# Patient Record
Sex: FEMALE | Race: BLACK OR AFRICAN AMERICAN | URBAN - METROPOLITAN AREA
[De-identification: names, ages, dates, MRNs, and addresses within clinical notes are randomized per-mention and may not be internally consistent; named-entity substitution may affect disease eponyms.]

---

## 2017-01-02 ENCOUNTER — OFFICE VISIT (OUTPATIENT)
Dept: FAMILY MEDICINE | Facility: CLINIC | Age: 47
End: 2017-01-02
Payer: COMMERCIAL

## 2017-01-02 VITALS
BODY MASS INDEX: 26.13 KG/M2 | DIASTOLIC BLOOD PRESSURE: 63 MMHG | SYSTOLIC BLOOD PRESSURE: 101 MMHG | HEART RATE: 83 BPM | OXYGEN SATURATION: 100 % | WEIGHT: 154 LBS | TEMPERATURE: 99.3 F | RESPIRATION RATE: 20 BRPM

## 2017-01-02 DIAGNOSIS — Z23 NEED FOR PROPHYLACTIC VACCINATION AND INOCULATION AGAINST INFLUENZA: ICD-10-CM

## 2017-01-02 DIAGNOSIS — M25.511 RIGHT SHOULDER PAIN, UNSPECIFIED CHRONICITY: Primary | ICD-10-CM

## 2017-01-02 PROCEDURE — 90686 IIV4 VACC NO PRSV 0.5 ML IM: CPT | Performed by: FAMILY MEDICINE

## 2017-01-02 PROCEDURE — 90471 IMMUNIZATION ADMIN: CPT | Performed by: FAMILY MEDICINE

## 2017-01-02 PROCEDURE — 99213 OFFICE O/P EST LOW 20 MIN: CPT | Mod: 25 | Performed by: FAMILY MEDICINE

## 2017-01-02 ASSESSMENT — PAIN SCALES - GENERAL: PAINLEVEL: MODERATE PAIN (4)

## 2017-01-02 NOTE — MR AVS SNAPSHOT
After Visit Summary   1/2/2017    Maris Saenz    MRN: [id number]           Patient Information     Date Of Birth          1970        Visit Information        Provider Department      1/2/2017 7:20 AM Shreyas Alarcon MD [facility]        Today's Diagnoses     Right shoulder pain, unspecified chronicity    -  1     Need for prophylactic vaccination and inoculation against influenza           Care Instructions    Use over the counter ibuprofen ( advil/ motrin/ etc ) or naproxen ( alleve ) as needed, take with food    Physical therapy    Can still use occasionally tylenol/acetaminophen    Follow up if symptoms not improving        Follow-ups after your visit        Additional Services     [facility], AND CHIROPRACTIC REFERRAL       **This order will print in the [facility] Scheduling Office**    Physical Therapy, Hand Therapy and Chiropractic Care are available through:    *[facility]  *[facility]  *[facility]    Call one number to schedule at any of the above locations: [phone].    Your provider has referred you to: Physical Therapy at [facility] or [facility]    Indication/Reason for Referral: right shoulder   Onset of Illness: 2 months  Therapy Orders: Evaluate and Treat  Special Programs: None  Special Request: None    Rolan Gongora      Additional Comments for the Therapist or Chiropractor: needs good home exercise program for shoulder    Please be aware that coverage of these services is subject to the terms and limitations of your health insurance plan.  Call member services at your health plan with any benefit or coverage questions.      Please bring the following to your appointment:    *Your personal calendar for scheduling future appointments  *Comfortable clothing                  Who to contact     If you have questions or need follow up information about today's clinic visit or your schedule please contact  "[facility] directly at [phone].  Normal or non-critical lab and imaging results will be communicated to you by MyChart, letter or phone within 4 business days after the clinic has received the results. If you do not hear from us within 7 days, please contact the clinic through MyChart or phone. If you have a critical or abnormal lab result, we will notify you by phone as soon as possible.  Submit refill requests through [employer] or call your pharmacy and they will forward the refill request to us. Please allow 3 business days for your refill to be completed.          Additional Information About Your Visit        Office [employer] Information     [employer] lets you send messages to your doctor, view your test results, renew your prescriptions, schedule appointments and more. To sign up, go to [email or web address] . Click on \"Log in\" on the left side of the screen, which will take you to the Welcome page. Then click on \"Sign up Now\" on the right side of the page.     You will be asked to enter the access code listed below, as well as some personal information. Please follow the directions to create your username and password.     Your access code is: [email or web address]  Expires: 2017  8:04 AM     Your access code will  in 90 days. If you need help or a new code, please call your [facility] or [phone].        Your Vitals Were     Pulse Temperature Respirations Pulse Oximetry Breastfeeding?       83 99.3  F (37.4  C) (Oral) 20 100% No        Blood Pressure from Last 3 Encounters:   17 101/63   16 106/64   08/28/15 120/80    Weight from Last 3 Encounters:   17 154 lb (69.854 kg)   16 154 lb (69.854 kg)   08/28/15 149 lb (67.586 kg)              We Performed the Following     FLU VAC, SPLIT VIRUS IM > 3 YO (QUADRIVALENT) [54349]     [facility], AND CHIROPRACTIC REFERRAL     Vaccine Administration, Initial [86284]        Primary Care Provider Office Phone # Fax # "    Ale Farrell PA-C [phone]       [facility] [address]  [place]. \Hospital Has a New Name and Outlook.\"" [phone]        Thank you!     Thank you for choosing [facility]  for your care. Our goal is always to provide you with excellent care. Hearing back from our patients is one way we can continue to improve our services. Please take a few minutes to complete the written survey that you may receive in the mail after your visit with us. Thank you!             Your Updated Medication List - Protect others around you: Learn how to safely use, store and throw away your medicines at www.disposemymeds.org.      Notice  As of 1/2/2017  8:04 AM    You have not been prescribed any medications.

## 2017-01-02 NOTE — PATIENT INSTRUCTIONS
Use over the counter ibuprofen ( advil/ motrin/ etc ) or naproxen ( alleve ) as needed, take with food    Physical therapy    Can still use occasionally tylenol/acetaminophen    Follow up if symptoms not improving

## 2017-01-02 NOTE — PROGRESS NOTES
SUBJECTIVE:                                                    Maris Saenz is a 46 year old female who presents to clinic today for the following health issues:      Joint Pain     Onset: Couple months     Description:   Location: right shoulder  Character: Dull ache and Burning    Intensity: moderate    Progression of Symptoms: intermittent    Accompanying Signs & Symptoms:  Other symptoms: radiation of pain to center of hand   History:   Previous similar pain: YES- had something like this before but it went away       Precipitating factors:   Trauma or overuse: no     Alleviating factors:  Improved by: rest/inactivity and acetaminophen       Therapies Tried and outcome: Rest and Tylenol            Problem list and histories reviewed & adjusted, as indicated.  Additional history: as documented              Injectable Influenza Immunization Documentation    1.  Is the person to be vaccinated sick today?  No    2. Does the person to be vaccinated have an allergy to eggs or to a component of the vaccine?  No    3. Has the person to be vaccinated today ever had a serious reaction to influenza vaccine in the past?  No    4. Has the person to be vaccinated ever had Guillain-[place] syndrome?  No     Form completed by Libertad Gee CMA     HPI      ROS  Was better but now worse again for the last 3 weeks    Right hand seems swollen    Pain goes custodial down arm       No change recently    No new  Hobbies/ sports etc    Not working for a few days so pain not as bad    Hard to do things over head    Not much exercise recently    Does not have to lift things up high at work        Physical Exam    Full physical not done     Mentation and affect are fine    No tremor of speech or extremity    Neck range of motion is fine, no radiculopathy ; just some mild subj soreness right side of neck    Some limitations on right shoulder range of motion both passive and active    Pain when arm elevated above  head    Sensation and strength good distally in arms/ hands    Good radial pulses    No point tenderness over shoulder / scapula etc ; patient states when she has been using it a lot then it is tender on front      No obvious impingement signs    ASSESSMENT / PLAN:  (M25.511) Right shoulder pain, unspecified chronicity  (primary encounter diagnosis)  Comment: overall exam is not alarming.  Prudent to work with physical therapist.  Also try over the counter nsaids.   Plan: [facility] HAND, AND CHIROPRACTIC REFERRAL        Follow up if symptoms not improving     (Z23) Need for prophylactic vaccination and inoculation against influenza  Comment: needs   Plan: FLU VAC, SPLIT VIRUS IM > 3 YO (QUADRIVALENT)         [38634], Vaccine Administration, Initial         [05559]        Given       I reviewed the patient's medications, allergies, medical history, family history, and social history.    Shreyas Alarcon MD

## 2017-01-02 NOTE — NURSING NOTE
"Chief Complaint   Patient presents with     Shoulder Pain     Health Maintenance     Other     bpa       Initial /63 mmHg  Pulse 83  Temp(Src) 99.3  F (37.4  C) (Oral)  Resp 20  Wt 154 lb (69.854 kg)  SpO2 100%  Breastfeeding? No Estimated body mass index is 26.13 kg/(m^2) as calculated from the following:    Height as of 2/19/16: 5' 4.37\" (1.635 m).    Weight as of this encounter: 154 lb (69.854 kg).  BP completed using cuff size: regular  Libertad Gee CMA       "

## 2017-01-03 ENCOUNTER — THERAPY VISIT (OUTPATIENT)
Dept: PHYSICAL THERAPY | Facility: CLINIC | Age: 47
End: 2017-01-03
Payer: COMMERCIAL

## 2017-01-03 DIAGNOSIS — M25.811 SHOULDER IMPINGEMENT, RIGHT: Primary | ICD-10-CM

## 2017-01-03 PROCEDURE — 97161 PT EVAL LOW COMPLEX 20 MIN: CPT | Mod: GP | Performed by: PHYSICAL THERAPIST

## 2017-01-03 PROCEDURE — 97110 THERAPEUTIC EXERCISES: CPT | Mod: GP | Performed by: PHYSICAL THERAPIST

## 2017-01-03 PROCEDURE — 97112 NEUROMUSCULAR REEDUCATION: CPT | Mod: GP | Performed by: PHYSICAL THERAPIST

## 2017-01-03 NOTE — PROGRESS NOTES
[facility] Initial Evaluation    Subjective:    Maris Saenz is a 46 year old female with a right shoulder condition.  Condition occurred with:  Unknown cause.  Condition occurred: for unknown reasons.  This is a chronic and recurrent condition  MD referral 1/2/2017.   Pain x 2 months overall.  Initial pain about 5 months ago and then it went away..    Patient reports pain:  Posterior and anterior.  Radiates to:  Upper arm.  Pain is described as aching (tight) and is intermittent and reported as 8/10.  Associated symptoms:  Painful arc and loss of motion/stiffness. Pain is the same all the time.  Symptoms are exacerbated by using arm overhead, using arm at shoulder level, lifting and using arm behind back and relieved by rest.  Since onset symptoms are unchanged.        General health as reported by patient is good.  Pertinent medical history includes:  Cancer.  Medical allergies: yes (codeine).  Other surgeries include:  Cancer surgery.  Current medications:  None as reported by the patient.  Current occupation is .    Employment tasks: push/ pull.                              Objective:    Standing Alignment:      Shoulder/UE:  Rounded shoulders                                       Shoulder Evaluation:  ROM:  AROM:    Flexion:  Right:  107    Abduction:  Right:  90    Internal Rotation:  Right:  Belt line                  PROM:    Flexion:  Right: 110      Abduction:  Right:  100                          Strength:  : resistance increases pain.  Flexion: Right: 4+/5     Pain:   Extension:  Right: 5-/5    Pain:  Abduction:  Right: 4+/5     Pain:  Adduction:  Right: 5/5     Pain:  Internal Rotation:  Right: 5-/5     Pain:  External Rotation:   Right:4+/5     Pain:                Special Tests:      Right shoulder positive for the following special tests:Impingement and Bursal  Right shoulder negative for the following special tests:Neural Tension and Rotator cuff tear  Palpation:       Right shoulder tenderness present at: Acrimioclavicular; Supraspinatus; Infraspinatus and Deltoid                                     General     ROS    Assessment/Plan:      Patient is a 46 year old female with right side shoulder complaints.    Patient has the following significant findings with corresponding treatment plan.                Diagnosis 1:  R shoulder impingement  Pain -  manual therapy, self management, education, directional preference exercise and home program  Decreased ROM/flexibility - manual therapy and therapeutic exercise  Decreased strength - therapeutic exercise and therapeutic activities  Impaired muscle performance - neuro re-education  Decreased function - therapeutic activities    Therapy Evaluation Codes:   1) History comprised of:   Personal factors that impact the plan of care:      None.    Comorbidity factors that impact the plan of care are:      Cancer.     Medications impacting care: None.  2) Examination of Body Systems comprised of:   Body structures and functions that impact the plan of care:      Shoulder.   Activity limitations that impact the plan of care are:      Dressing.   Clinical presentation characteristics are:    Stable/Uncomplicated.  3) Presentation comprised of:   Presentation scored as Low complexity with uncomplicated characteristics..  4) Decision-Making    Low complexity using standardized patient assessment instrument and/or measureable assessment of functional outcome.  Cumulative Therapy Evaluation is: Low complexity.    Previous and current functional limitations:  (See Goal Flow Sheet for this information)    Short term and Long term goals: (See Goal Flow Sheet for this information)     Communication ability:  Patient appears to be able to clearly communicate and understand verbal and written communication and follow directions correctly.  Treatment Explanation - The following has been discussed with the patient:   RX ordered/plan of  care  Anticipated outcomes  Possible risks and side effects  This patient would benefit from PT intervention to resume normal activities.   Rehab potential is good.    Frequency:  1 X week, once daily  Duration:  for 8 weeks  Discharge Plan:  Achieve all LTG.  Independent in home treatment program.  Reach maximal therapeutic benefit.    Please refer to the daily flowsheet for treatment today, total treatment time and time spent performing 1:1 timed codes.

## 2017-01-04 PROBLEM — M25.811 SHOULDER IMPINGEMENT, RIGHT: Status: ACTIVE | Noted: 2017-01-04

## 2017-04-24 ENCOUNTER — OFFICE VISIT (OUTPATIENT)
Dept: INTERNAL MEDICINE | Facility: CLINIC | Age: 47
End: 2017-04-24
Payer: COMMERCIAL

## 2017-04-24 VITALS
BODY MASS INDEX: 26.29 KG/M2 | HEART RATE: 93 BPM | OXYGEN SATURATION: 100 % | WEIGHT: 154 LBS | HEIGHT: 64 IN | SYSTOLIC BLOOD PRESSURE: 106 MMHG | DIASTOLIC BLOOD PRESSURE: 67 MMHG | TEMPERATURE: 97.8 F

## 2017-04-24 DIAGNOSIS — M25.811 SHOULDER IMPINGEMENT, RIGHT: ICD-10-CM

## 2017-04-24 DIAGNOSIS — T78.40XA ALLERGIC REACTION, INITIAL ENCOUNTER: ICD-10-CM

## 2017-04-24 DIAGNOSIS — Z11.3 SCREEN FOR STD (SEXUALLY TRANSMITTED DISEASE): ICD-10-CM

## 2017-04-24 DIAGNOSIS — Z00.00 ROUTINE GENERAL MEDICAL EXAMINATION AT A HEALTH CARE FACILITY: Primary | ICD-10-CM

## 2017-04-24 DIAGNOSIS — C53.9 MALIGNANT NEOPLASM OF CERVIX, UNSPECIFIED SITE (H): ICD-10-CM

## 2017-04-24 LAB
ANION GAP SERPL CALCULATED.3IONS-SCNC: 7 MMOL/L (ref 3–14)
BUN SERPL-MCNC: 8 MG/DL (ref 7–30)
C TRACH DNA SPEC QL NAA+PROBE: ABNORMAL
CALCIUM SERPL-MCNC: 8.9 MG/DL (ref 8.5–10.1)
CHLORIDE SERPL-SCNC: 104 MMOL/L (ref 94–109)
CO2 SERPL-SCNC: 27 MMOL/L (ref 20–32)
CREAT SERPL-MCNC: 0.78 MG/DL (ref 0.52–1.04)
GFR SERPL CREATININE-BSD FRML MDRD: 78 ML/MIN/1.7M2
GLUCOSE SERPL-MCNC: 84 MG/DL (ref 70–99)
N GONORRHOEA DNA SPEC QL NAA+PROBE: ABNORMAL
POTASSIUM SERPL-SCNC: 3.9 MMOL/L (ref 3.4–5.3)
SODIUM SERPL-SCNC: 138 MMOL/L (ref 133–144)
SPECIMEN SOURCE: ABNORMAL
SPECIMEN SOURCE: ABNORMAL
TSH SERPL DL<=0.005 MIU/L-ACNC: 1.26 MU/L (ref 0.4–4)

## 2017-04-24 PROCEDURE — G0145 SCR C/V CYTO,THINLAYER,RESCR: HCPCS | Performed by: NURSE PRACTITIONER

## 2017-04-24 PROCEDURE — 87491 CHLMYD TRACH DNA AMP PROBE: CPT | Performed by: NURSE PRACTITIONER

## 2017-04-24 PROCEDURE — 86780 TREPONEMA PALLIDUM: CPT | Performed by: NURSE PRACTITIONER

## 2017-04-24 PROCEDURE — 87522 HEPATITIS C REVRS TRNSCRPJ: CPT | Performed by: NURSE PRACTITIONER

## 2017-04-24 PROCEDURE — 84443 ASSAY THYROID STIM HORMONE: CPT | Performed by: NURSE PRACTITIONER

## 2017-04-24 PROCEDURE — 87591 N.GONORRHOEAE DNA AMP PROB: CPT | Performed by: NURSE PRACTITIONER

## 2017-04-24 PROCEDURE — 87389 HIV-1 AG W/HIV-1&-2 AB AG IA: CPT | Performed by: NURSE PRACTITIONER

## 2017-04-24 PROCEDURE — 36415 COLL VENOUS BLD VENIPUNCTURE: CPT | Performed by: NURSE PRACTITIONER

## 2017-04-24 PROCEDURE — 80048 BASIC METABOLIC PNL TOTAL CA: CPT | Performed by: NURSE PRACTITIONER

## 2017-04-24 PROCEDURE — 99396 PREV VISIT EST AGE 40-64: CPT | Performed by: NURSE PRACTITIONER

## 2017-04-24 PROCEDURE — 87624 HPV HI-RISK TYP POOLED RSLT: CPT | Performed by: NURSE PRACTITIONER

## 2017-04-24 PROCEDURE — 87340 HEPATITIS B SURFACE AG IA: CPT | Performed by: NURSE PRACTITIONER

## 2017-04-24 RX ORDER — FEXOFENADINE HCL 180 MG/1
180 TABLET ORAL DAILY
Qty: 30 TABLET | Refills: 3 | Status: SHIPPED | OUTPATIENT
Start: 2017-04-24

## 2017-04-24 ASSESSMENT — PAIN SCALES - GENERAL: PAINLEVEL: NO PAIN (0)

## 2017-04-24 NOTE — NURSING NOTE
"Chief Complaint   Patient presents with     Physical       Initial /67  Pulse 93  Temp 97.8  F (36.6  C) (Oral)  Ht 5' 4\" (1.626 m)  Wt 154 lb (69.9 kg)  LMP 04/15/2017  SpO2 100%  Breastfeeding? No  BMI 26.43 kg/m2 Estimated body mass index is 26.43 kg/(m^2) as calculated from the following:    Height as of this encounter: 5' 4\" (1.626 m).    Weight as of this encounter: 154 lb (69.9 kg).  Medication Reconciliation: complete.  SATHISH Simon MA      "

## 2017-04-24 NOTE — LETTER
[facility]  [address] [place]  [address]  [phone]        April 28, 2017    Maris Saenz  [address]   [facility] [id number]        Dear Maris,    The results of your recent labs are enclosed.   Your labs look good! STD screening is negative.     Results for orders placed or performed in visit on 04/24/17   Basic metabolic panel   Result Value Ref Range    Sodium 138 133 - 144 mmol/L    Potassium 3.9 3.4 - 5.3 mmol/L    Chloride 104 94 - 109 mmol/L    Carbon Dioxide 27 20 - 32 mmol/L    Anion Gap 7 3 - 14 mmol/L    Glucose 84 70 - 99 mg/dL    Urea Nitrogen 8 7 - 30 mg/dL    Creatinine 0.78 0.52 - 1.04 mg/dL    GFR Estimate 78 >60 mL/min/1.7m2    GFR Estimate If Black >90   GFR Calc   >60 mL/min/1.7m2    Calcium 8.9 8.5 - 10.1 mg/dL   Pap imaged thin layer screen with HPV - recommended age 30 - 65 years (select HPV order below)   Result Value Ref Range    PAP NIL     Copath Report         Patient Name: MARIS SAENZ  MR#: [id number]  Specimen #: [id number]  Collected: 4/24/2017  Received: 4/25/2017  Reported: 4/26/2017 10:41  Ordering Phy(s): CHADWICK SARMIENTO    For improved result formatting, select 'View Enhanced Report Format'  under Linked Documents section.    SPECIMEN/STAIN PROCESS:  Pap imaged thin layer prep screening (Surepath, FocalPoint with guided  screening)       Pap-Cyto x 1, HPV ordered x 1    SOURCE: Cervical, endocervical  ----------------------------------------------------------------   Pap imaged thin layer prep screening (Surepath, FocalPoint with guided  screening)  SPECIMEN ADEQUACY:  Satisfactory for evaluation.  -Transformation zone component absent.    CYTOLOGIC INTERPRETATION:    Negative for Intraepithelial Lesion or Malignancy    Electronically signed out by:  ELICEO Ocasio (ASCP)    Processed and screened at [facility],  [facility]    CLINICAL HISTORY:  LMP:  4/15/2017  Previous normal  pap  Date of Last Pap: 8/28/2015  Gyn Cancer:: Cervical Cancer 10 years ago,    Papanicolaou Test Limitations:  Cervical cytology is a screening test  with limited sensitivity; regular screening is critical for cancer  prevention; Pap tests are primarily effective for the  diagnosis/prevention of squamous cell carcinoma, not adenocarcinomas or  other cancers.    TESTING LAB LOCATION:  [address]  [phone]    COLLECTION SITE:  Client:  [facility]  Location: [facility] (B)     HPV High Risk Types DNA Cervical   Result Value Ref Range    HPV 16 DNA Negative NEG    HPV 18 DNA Negative NEG    Other HR HPV Negative NEG    Final Diagnosis       This patient's sample is negative for HPV DNA.  (Note)  METHODOLOGY:  The Roche jorge alberto 4800 system uses automated extraction,  simultaneous amplification of HPV (L1 region) and beta-globin,  followed by  real time detection of fluorescent labeled HPV and beta  globin using specific oligonucleotide probes . The test specifically  identifies types HPV 16 DNA and HPV 18 DNA while concurrently  detecting the rest of the high risk types (31, 33, 35, 39, 45, 51,  52, 56, 58, 59, 66 or 68).    COMMENTS:  This test is not intended for use as a screening device  for women under age 30 with normal cervical cytology.  Results should  be correlated with cytologic and histologic findings. Close clinical  followup is recommended.    This test was developed and its performance characteristics  determined by the [facility], [facility]. It has not been cleared or approved by the  FDA. The laboratory is regulated under CLIA as qualified to perform  high- complexity testing. This test is used for clinical purposes. It  should not be regarded as investigational or for research.        Specimen Description Cervical  Cells  [id number]      HIV Antigen Antibody Combo   Result Value Ref Range    HIV Antigen Antibody Combo  NR     Nonreactive   HIV-1 p24 Ag & HIV-1/HIV-2 Ab Not Detected     Anti Treponema   Result Value Ref Range    Treponema pallidum Antibody Negative NEG   Hepatitis C RNA, quantitative   Result Value Ref Range    HCV RNA Quant IU/ml  HCVND [IU]/mL     HCV RNA Not Detected   The MONICA AmpliPrep/MONICA TaqMan HCV Test is an FDA-approved in vitro nucleic   acid amplification test for the quantitation of HCV RNA in human plasma (ETDA   plasma) or serum using the MONICA AmpliPrep Instrument for automated viral   nucleic acid extraction and the MONICA TaqMan Analyzer or Compound Time TaqMan for   automated Real Time PCR amplification and detection of the viral nucleic acid   target.   Titer results are reported in International Units/mL (IU/mL) using the 1st WHO   International standard for HCV for Nucleic Acid Amplification based assays.      Log of HCV RNA Qt Not Calculated <1.2 Log IU/mL   Hepatitis B surface antigen   Result Value Ref Range    Hep B Surface Agn Nonreactive NR   TSH with free T4 reflex   Result Value Ref Range    TSH 1.26 0.40 - 4.00 mU/L   Neisseria gonorrhoeae PCR   Result Value Ref Range    Specimen Descrip Urine     N Gonorrhea PCR (A) NEG     Test canceled by physician  ORDER ERROR, WRONG SOURCE.  TESTING REORDERED.     Chlamydia trachomatis PCR   Result Value Ref Range    Specimen Description Urine     Chlamydia Trachomatis PCR (A) NEG     Test canceled by physician  ORDER ERROR, WRONG SOURCE.  TESTING REORDERED.     Neisseria gonorrhoeae PCR   Result Value Ref Range    Specimen Descrip Endocervical     N Gonorrhea PCR  NEG     Negative   Negative for N. gonorrhoeae rRNA by transcription mediated amplification.   A negative result by transcription mediated amplification does not preclude the   presence of N. gonorrhoeae infection because results are dependent on proper   and adequate collection, absence  of inhibitors, and sufficient rRNA to be   detected.     Chlamydia trachomatis PCR   Result Value Ref Range    Specimen Description Endocervical     Chlamydia Trachomatis PCR  NEG     Negative   Negative for C. trachomatis rRNA by transcription mediated amplification.   A negative result by transcription mediated amplification does not preclude the   presence of C. trachomatis infection because results are dependent on proper   and adequate collection, absence of inhibitors, and sufficient rRNA to be   detected.         If you have any questions please call the clinic at [phone].    Sincerely,    Noemi COFFMANL

## 2017-04-24 NOTE — MR AVS SNAPSHOT
After Visit Summary   4/24/2017    Maris Saenz    MRN: [id number]           Patient Information     Date Of Birth          1970        Visit Information        Provider Department      4/24/2017 9:20 AM Noemi Spring APRN [facility]        Today's Diagnoses     Encounter for preventative adult health care exam with abnormal findings    -  1    Allergic reaction, initial encounter        Malignant neoplasm of cervix, unspecified site (H)        Screen for STD (sexually transmitted disease)        Shoulder impingement, right          Care Instructions      Preventive Health Recommendations  Female Ages 40 to 49    Yearly exam:     See your health care provider every year in order to  1. Review health changes.   2. Discuss preventive care.    3. Review your medicines if your doctor prescribed any.      Get a Pap test every three years (unless you have an abnormal result and your provider advises testing more often).      If you get Pap tests with HPV test, you only need to test every 5 years, unless you have an abnormal result. You do not need a Pap test if your uterus was removed (hysterectomy) and you have not had cancer.      You should be tested each year for STDs (sexually transmitted diseases), if you're at risk.       Ask your doctor if you should have a mammogram.      Have a colonoscopy (test for colon cancer) if someone in your family has had colon cancer or polyps before age 50.       Have a cholesterol test every 5 years.       Have a diabetes test (fasting glucose) after age 45. If you are at risk for diabetes, you should have this test every 3 years.    Shots: Get a flu shot each year. Get a tetanus shot every 10 years.     Nutrition:     Eat at least 5 servings of fruits and vegetables each day.    Eat whole-grain bread, whole-wheat pasta and brown rice instead of white grains and rice.    Talk to your provider about Calcium and Vitamin D.      Lifestyle    Exercise at least 150 minutes a week (an average of 30 minutes a day, 5 days a week). This will help you control your weight and prevent disease.    Limit alcohol to one drink per day.    No smoking.     Wear sunscreen to prevent skin cancer.    See your dentist every six months for an exam and cleaning.        Follow-ups after your visit        Additional Services     ORTHO  REFERRAL       [facility] is referring you to the Orthopedic  Services at [facility].       The  Representative will assist you in the coordination of your Orthopedic and Musculoskeletal Care as prescribed by your physician.    The  Representative will call you within 1 business day to help schedule your appointment, or you may contact the  Representative at:    All areas ~ [phone]     Type of Referral : shoulder injection       Timeframe requested: Routine    Coverage of these services is subject to the terms and limitations of your health insurance plan.  Please call member services at your health plan with any benefit or coverage questions.      If X-rays, CT or MRI's have been performed, please contact the facility where they were done to arrange for , prior to your scheduled appointment.  Please bring this referral request to your appointment and present it to your specialist.                  Who to contact     If you have questions or need follow up information about today's clinic visit or your schedule please contact [facility] directly at [phone].  Normal or non-critical lab and imaging results will be communicated to you by MyChart, letter or phone within 4 business days after the clinic has received the results. If you do not hear from us within 7 days, please contact the clinic through MyChart or phone. If you have a critical or abnormal lab result, we will notify you by phone as soon as  "possible.  Submit refill requests through [employer] or call your pharmacy and they will forward the refill request to us. Please allow 3 business days for your refill to be completed.          Additional Information About Your Visit        [employer] Information     [employer] lets you send messages to your doctor, view your test results, renew your prescriptions, schedule appointments and more. To sign up, go to [email or web address] . Click on \"Log in\" on the left side of the screen, which will take you to the Welcome page. Then click on \"Sign up Now\" on the right side of the page.     You will be asked to enter the access code listed below, as well as some personal information. Please follow the directions to create your username and password.     Your access code is: [email or web address]  Expires: 2017 10:20 AM     Your access code will  in 90 days. If you need help or a new code, please call your [place] clinic or [phone].        Care EveryWhere ID     This is your Care EveryWhere ID. This could be used by other organizations to access your [place] medical records  [id number]        Your Vitals Were     Pulse Temperature Height Last Period Pulse Oximetry Breastfeeding?    93 97.8  F (36.6  C) (Oral) 5' 4\" (1.626 m) 04/15/2017 100% No    BMI (Body Mass Index)                   26.43 kg/m2            Blood Pressure from Last 3 Encounters:   17 106/67   17 101/63   16 106/64    Weight from Last 3 Encounters:   17 154 lb (69.9 kg)   17 154 lb (69.9 kg)   16 154 lb (69.9 kg)              We Performed the Following     Anti Treponema     Basic metabolic panel     Chlamydia trachomatis PCR     Hepatitis B surface antigen     Hepatitis C RNA, quantitative     HIV Antigen Antibody Combo     HPV High Risk Types DNA Cervical     Neisseria gonorrhoeae PCR     ORTHO  REFERRAL     Pap imaged thin layer screen with HPV - recommended age 30 - 65 years (select HPV order below)  "    TSH with free T4 reflex          Today's Medication Changes          These changes are accurate as of: 4/24/17 10:20 AM.  If you have any questions, ask your nurse or doctor.               Start taking these medicines.        Dose/Directions    fexofenadine 180 MG tablet   Commonly known as:  ALLEGRA   Used for:  Allergic reaction, initial encounter   Started by:  Noemi Spring APRN CNP        Dose:  180 mg   Take 1 tablet (180 mg) by mouth daily   Quantity:  30 tablet   Refills:  3            Where to get your medicines      These medications were sent to [facility] - [place], [place] - [address]  [address], [facility] [address]     Phone:  [phone]     fexofenadine 180 MG tablet                Primary Care Provider Office Phone # Fax #    Ale Farrell PA-C [phone]       [facility] [address]  [place]. \[facility].\"" [id number]        Thank you!     Thank you for choosing [facility]  for your care. Our goal is always to provide you with excellent care. Hearing back from our patients is one way we can continue to improve our services. Please take a few minutes to complete the written survey that you may receive in the mail after your visit with us. Thank you!             Your Updated Medication List - Protect others around you: Learn how to safely use, store and throw away your medicines at www.disposemymeds.org.          This list is accurate as of: 4/24/17 10:20 AM.  Always use your most recent med list.                   Brand Name Dispense Instructions for use    fexofenadine 180 MG tablet    ALLEGRA    30 tablet    Take 1 tablet (180 mg) by mouth daily
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
## 2017-04-24 NOTE — LETTER
May 5, 2017    Maris Saenz  [address] [facility] [address]    Dear Maris,  We are happy to inform you that your PAP smear result from 4/24/17 is normal.  We are now able to do a follow up test on PAP smears. The DNA test is for HPV (Human Papilloma Virus). Cervical cancer is closely linked with certain types of HPV. Your result showed no evidence of high risk HPV.  Therefore we recommend you return in 3 years for your next pap smear.  You will still need to return to the clinic every year for an annual exam and other preventive tests.  Please contact the clinic at [phone] with any questions.  Sincerely,    ARGENIS Norton CNP/rlm

## 2017-04-25 LAB
C TRACH DNA SPEC QL NAA+PROBE: NORMAL
HBV SURFACE AG SERPL QL IA: NONREACTIVE
HIV 1+2 AB+HIV1 P24 AG SERPL QL IA: NORMAL
N GONORRHOEA DNA SPEC QL NAA+PROBE: NORMAL
SPECIMEN SOURCE: NORMAL
SPECIMEN SOURCE: NORMAL
T PALLIDUM IGG+IGM SER QL: NEGATIVE

## 2017-04-26 LAB
COPATH REPORT: NORMAL
PAP: NORMAL

## 2017-04-27 LAB
FINAL DIAGNOSIS: NORMAL
HCV RNA SERPL NAA+PROBE-ACNC: NORMAL [IU]/ML
HCV RNA SERPL NAA+PROBE-LOG IU: NORMAL LOG IU/ML
HPV HR 12 DNA CVX QL NAA+PROBE: NEGATIVE
HPV16 DNA SPEC QL NAA+PROBE: NEGATIVE
HPV18 DNA SPEC QL NAA+PROBE: NEGATIVE
SPECIMEN DESCRIPTION: NORMAL

## 2017-04-28 NOTE — PROGRESS NOTES
[address] [address]  Phone: [phone]      04/28/17    Maris Saenz  [address] [facility] [address]      Dear Maris,    The results of your recent labs are enclosed.  Your labs look good! STD screening is negative.     Please call the clinic if you have any concerns.    Sincerely,    ARGENIS Norton CNP    [facility] Care Team

## 2017-04-29 ENCOUNTER — RADIANT APPOINTMENT (OUTPATIENT)
Dept: GENERAL RADIOLOGY | Facility: CLINIC | Age: 47
End: 2017-04-29
Attending: PEDIATRICS
Payer: COMMERCIAL

## 2017-04-29 ENCOUNTER — OFFICE VISIT (OUTPATIENT)
Dept: ORTHOPEDICS | Facility: CLINIC | Age: 47
End: 2017-04-29
Payer: COMMERCIAL

## 2017-04-29 VITALS
BODY MASS INDEX: 26.29 KG/M2 | SYSTOLIC BLOOD PRESSURE: 106 MMHG | WEIGHT: 154 LBS | HEIGHT: 64 IN | DIASTOLIC BLOOD PRESSURE: 70 MMHG

## 2017-04-29 DIAGNOSIS — M25.511 RIGHT SHOULDER PAIN, UNSPECIFIED CHRONICITY: Primary | ICD-10-CM

## 2017-04-29 DIAGNOSIS — M25.511 RIGHT SHOULDER PAIN, UNSPECIFIED CHRONICITY: ICD-10-CM

## 2017-04-29 DIAGNOSIS — M54.2 CERVICALGIA: ICD-10-CM

## 2017-04-29 PROCEDURE — 73030 X-RAY EXAM OF SHOULDER: CPT | Mod: RT

## 2017-04-29 PROCEDURE — 72040 X-RAY EXAM NECK SPINE 2-3 VW: CPT

## 2017-04-29 PROCEDURE — 20610 DRAIN/INJ JOINT/BURSA W/O US: CPT | Mod: RT | Performed by: PEDIATRICS

## 2017-04-29 PROCEDURE — 99244 OFF/OP CNSLTJ NEW/EST MOD 40: CPT | Mod: 25 | Performed by: PEDIATRICS

## 2017-04-29 NOTE — NURSING NOTE
"Chief Complaint   Patient presents with     Musculoskeletal Problem     right shoulder pain       Initial /70  Ht 5' 4\" (1.626 m)  Wt 154 lb (69.9 kg)  LMP 04/15/2017  BMI 26.43 kg/m2 Estimated body mass index is 26.43 kg/(m^2) as calculated from the following:    Height as of this encounter: 5' 4\" (1.626 m).    Weight as of this encounter: 154 lb (69.9 kg).  Medication Reconciliation: complete       Patient was given home exercise program at the direction of CAROLE KEEN  that included the following exercise(s) :    Exercise(s) chin tucks, scapular retraction: standing, Y,T,W    Repititions: 30    Times per day: 1    Patient demonstrated understanding of and the ability to perform these exercises. Patient was seen for 5 minutes to provide this home exercise program. Patient was directed to discontinue any exercises that cause pain, and to call the clinic if any questions.    "

## 2017-04-29 NOTE — PATIENT INSTRUCTIONS
Steroid injection of the right shoulder: subacromial space was performed today in clinic  Icing for the next 1-2 days may be helpful for pain. Injection may take 10-14 days to see the full effect.

## 2017-04-29 NOTE — PROGRESS NOTES
Sports Medicine Clinic Visit    PCP: Ale Farrell    Maris Saenz is a 47 year old female who is seen  in consultation at the request of Noemi MORE CNP presenting with right shoulder pain.  Pain has been present for about 2 years with no known injury.  She has been seen by PT and states she had no relief.  Pain is diffuse through the shoulder, anterior and posterior.  She has noticed some tingling in her fingers thumb and index.  She does have pain in her neck as well. Patient confirms pain at rest. Patient explains that sitting back with her shoulder blade, causing tenderness.  Patient feels mild tingling in her thumb currently. Patient explains that pain shoots down her arm.  Cold increases thumb tingling.   Pain is worse with laying down, lifting, overhead motions. Patient recalls getting therapy for a snapping in her wrist.   Patient is right hand dominant.     Injury: gradual onset    Location of Pain: right shoulder diffuse  Duration of Pain: 2 year(s)  Rating of Pain at worst: 7/10  Rating of Pain Currently: 3/10  Symptoms are better with: Nothing  Symptoms are worse with: movement, laying down  Additional Features:   Positive: popping, instability, paresthesias and weakness   Negative: swelling, bruising, grinding, catching, locking, numbness, pain in other joints and systemic symptoms  Other evaluation and/or treatments so far consists of: PT  Prior History of related problems: denies    Social History: [employer], repetitious , but not heavy work    Review of Systems  Musculoskeletal: as above  Remainder of review of systems is negative including constitutional, CV, pulmonary, GI, Skin and Neurologic except as noted in HPI or medical history.    This document serves as a record of the services and decisions personally performed and made by Gibran Villarreal DO, CAQ. It was created on his behalf by Benny Chávez, a trained medical scribe. The creation of this document is  "based the provider's statements to the medical scribe.  Benny Chávez April 29, 2017 11:20 AM       Past Medical History:   Diagnosis Date     Cancer (H)     Cervical cancer 10+ years ago     Cervical cancer (H) 8/28/2015     Recurrent otitis media of right ear 8/28/2015     Past Surgical History:   Procedure Laterality Date     GYN SURGERY      Sterilization 10+ years ago     GYN SURGERY      cone bx     PE TUBES      right      No family history on file.  Social History     Social History     Marital status:      Spouse name: N/A     Number of children: N/A     Years of education: N/A     Occupational History     Not on file.     Social History Main Topics     Smoking status: Former Smoker     Types: Other     Smokeless tobacco: Never Used     Alcohol use No     Drug use: No     Sexual activity: Yes     Partners: Male     Other Topics Concern     Parent/Sibling W/ Cabg, Mi Or Angioplasty Before 65f 55m? No     Social History Narrative       Objective  /70  Ht 5' 4\" (1.626 m)  Wt 154 lb (69.9 kg)  LMP 04/15/2017  BMI 26.43 kg/m2      GENERAL APPEARANCE: healthy, alert and no distress   GAIT: NORMAL  SKIN: no suspicious lesions or rashes  NEURO: Normal strength and tone, mentation intact and speech normal  PSYCH:  mentation appears normal and affect normal/bright  HEENT: no scleral icterus  CV: no extremity edema  RESP: nonlabored breathing      Right Shoulder exam    ROM:        forward flexion pain 110 pain begins, able to make it 130 deg       abduction 100-110 degrees        internal rotation 1-2 vertebral segments lower        external rotation symmetric   Active above  Passive exceeds active, pain end of motion    Tender:        subacromial space    Non Tender:       remainder of shoulder    Strength:        abduction 5-/5       internal rotation 5/5       external rotation 4+/5       adduction 5/5    Impingement testing:        positive (+) empty can, patient's pain reproduced        Mild " pain with T[place]         Crossover, patient unable to raise her elbow due to pain    Skin:       no visible deformities       well perfused       capillary refill brisk    Sensation:        normal sensation over shoulder and upper extremity       Cervical Spine Exam    Range of Motion:         flexion full no change in pain        extension full no change in pain         right lateral bending pain on the right arm        left lateral bending pain further into the right arm      Inspection:       No visible deformity        normal lordotic curvature maintained    Tender:         Right paraspinal muscles       Right upper border of trapezius    Non-Tender:      remainder of cervical spine area    Strength:     C4 (shoulder shrug)  symmetric 5/5       C5 (shoulder abduction) symmetric 5/5       C6 (elbow flexion) symmetric 5/5       C7 (elbow extension) symmetric 5/5       C8 (finger abduction, thumb flexion) symmetric 5/5       Right side  4/5    Reflexes:      C5 (biceps) symmetric diminished       C6 (supinator) symmetric diminished       C7 (triceps) symmetric diminished      Sensation:     grossly intact througout bilateral upper extremities    Special Tests:     positive (+) Spurling for tingling in the right UE    Skin:     well perfused       capillary refill brisk    Lymphatics:      no edema noted in the upper extremities         Radiology  Visualized radiographs of c-spine and right shoulder obtained today, and reviewed the images with the patient.  Impression: shoulder negative. c-spine with some degenerative change.  Reports reviewed:    XR Shoulder Right G/E 3 Views    Narrative    XR SHOULDER RT G/E 3 VW 4/29/2017 10:58 AM    HISTORY: Pain.    COMPARISON: None.      Impression    IMPRESSION: No evidence of acute fracture or malalignment.    ANDRES CONWAY MD   XR Cervical Spine 2/3 Views    Narrative    XR CERVICAL SPINE 2/3 VWS 4/29/2017 11:06 AM    HISTORY: Pain.    COMPARISON: None.       Impression    IMPRESSION: Minimal flexion of the cervical spine. No evidence of  acute fracture. The prevertebral soft tissues are unremarkable.  Moderate degenerative change of C4-C6 with anterior osteophytes.    ANDRES CONWAY MD         Assessment:  1. Right shoulder pain, unspecified chronicity    2. Cervicalgia    underlying cervical spondylosis.  Shoulder pain may be related to impingement.    Plan:  Discussed the assessment with the patient.    Radiologic images reviewed and discussed with patient today    We discussed the following treatment options: symptom treatment, activity modification/rest, imaging, rehab and injection therapy. Following discussion, plan:    Topical Treatments: Ice or Heat  Over the counter medication: Patient's preferred OTC medication as directed on packaging.  MRI of the right shoulder discussed as consideration; start with injection  Activity Modification: as discussed   Rehab: Home Exercise Program--continue with from prior PT  Physical Therapy: discussed the patient's return to physical therapy; patient deferred pending injection.   Steroid injection of the right shoulder: subacromial space was performed today in clinic  Icing for the next 1-2 days may be helpful for pain. Injection may take 10-14 days to see the full effect.    Discussed steroid injection, including risks, potential benefits, and alternatives.  The patient expressed understanding.  Obtained verbal and written consent and the patient elected to proceed.  Procedure: A steroid injection was performed under aseptic technique at right subacromial space using 1% plain Lidocaine and 40 mg of Kenalog. Bandage applied. This was well tolerated.    Follow up: or call in 2-3 weeks if no improvement of symptoms   Questions answered. The patient indicates understanding of these issues and agrees with the plan.    Gibran Villarreal DO, CAQ    CC: Noemi MORE CNP          Disclaimer: This note consists of symbols derived  from keyboarding, dictation and/or voice recognition software. As a result, there may be errors in the script that have gone undetected. Please consider this when interpreting information found in this chart.    The information in this document, created by the medical scribe for me, accurately reflects the services I personally performed and the decisions made by me. I have reviewed and approved this document for accuracy.   Gibran Villarreal DO, CAQ

## 2017-05-08 RX ORDER — TRIAMCINOLONE ACETONIDE 40 MG/ML
40 INJECTION, SUSPENSION INTRA-ARTICULAR; INTRAMUSCULAR ONCE
Qty: 1 ML | Refills: 0 | OUTPATIENT
Start: 2017-05-08 | End: 2017-05-08

## 2017-07-03 PROBLEM — M25.811 SHOULDER IMPINGEMENT, RIGHT: Status: RESOLVED | Noted: 2017-01-04 | Resolved: 2017-07-03

## 2020-09-01 ENCOUNTER — OFFICE VISIT (OUTPATIENT)
Dept: FAMILY MEDICINE | Facility: CLINIC | Age: 50
End: 2020-09-01
Payer: COMMERCIAL

## 2020-09-01 VITALS
WEIGHT: 147 LBS | OXYGEN SATURATION: 98 % | TEMPERATURE: 98.7 F | SYSTOLIC BLOOD PRESSURE: 106 MMHG | HEART RATE: 71 BPM | BODY MASS INDEX: 25.1 KG/M2 | HEIGHT: 64 IN | DIASTOLIC BLOOD PRESSURE: 72 MMHG

## 2020-09-01 DIAGNOSIS — Z11.4 SCREENING FOR HIV (HUMAN IMMUNODEFICIENCY VIRUS): ICD-10-CM

## 2020-09-01 DIAGNOSIS — R53.83 FATIGUE, UNSPECIFIED TYPE: ICD-10-CM

## 2020-09-01 DIAGNOSIS — Z13.220 SCREENING CHOLESTEROL LEVEL: ICD-10-CM

## 2020-09-01 DIAGNOSIS — N63.0 LUMP OR MASS IN BREAST: Primary | ICD-10-CM

## 2020-09-01 DIAGNOSIS — Z23 NEED FOR SHINGLES VACCINE: ICD-10-CM

## 2020-09-01 DIAGNOSIS — Z23 NEED FOR TETANUS BOOSTER: ICD-10-CM

## 2020-09-01 DIAGNOSIS — D50.9 IRON DEFICIENCY ANEMIA, UNSPECIFIED IRON DEFICIENCY ANEMIA TYPE: ICD-10-CM

## 2020-09-01 DIAGNOSIS — Z12.11 COLON CANCER SCREENING: ICD-10-CM

## 2020-09-01 PROCEDURE — 90471 IMMUNIZATION ADMIN: CPT | Performed by: PHYSICIAN ASSISTANT

## 2020-09-01 PROCEDURE — 90472 IMMUNIZATION ADMIN EACH ADD: CPT | Performed by: PHYSICIAN ASSISTANT

## 2020-09-01 PROCEDURE — 90714 TD VACC NO PRESV 7 YRS+ IM: CPT | Performed by: PHYSICIAN ASSISTANT

## 2020-09-01 PROCEDURE — 90750 HZV VACC RECOMBINANT IM: CPT | Performed by: PHYSICIAN ASSISTANT

## 2020-09-01 PROCEDURE — 99214 OFFICE O/P EST MOD 30 MIN: CPT | Mod: 25 | Performed by: PHYSICIAN ASSISTANT

## 2020-09-01 ASSESSMENT — MIFFLIN-ST. JEOR: SCORE: 1279.24

## 2020-09-01 NOTE — PROGRESS NOTES
"Subjective     Maris Saenz is a 50 year old female who presents to clinic today for the following health issues:    HPI     Right breast lump that patient found a few days ago. She had a breast lump in the same spot in 2016. Was a cyst that was eventually aspirated. She has not had any symptoms since that time. She has mild pain with the new lump. She hasn't noticed a size change since it appeared. No skin redness.     Mom's sister had breast cancer.     Has been living in [place]. Was on a blood pressure medication briefly. Also now taking iron supplements. Wants to have a recheck of her annual labs and have an annual physical. She'll come back to have a PAP smear and go over her labs.     Review of Systems   Constitutional, HEENT, cardiovascular, pulmonary, gi and gu systems are negative, except as otherwise noted.      Objective    /72 (BP Location: Right arm, Patient Position: Chair, Cuff Size: Adult Regular)   Pulse 71   Temp 98.7  F (37.1  C) (Oral)   Ht 1.638 m (5' 4.47\")   Wt 66.7 kg (147 lb)   SpO2 98%   BMI 24.87 kg/m    Body mass index is 24.87 kg/m .  Physical Exam   GENERAL: healthy, alert and no distress  NECK: no adenopathy, no asymmetry, masses, or scars and thyroid normal to palpation  RESP: lungs clear to auscultation - no rales, rhonchi or wheezes  BREAST: no palpable axillary masses or adenopathy, mass right breast 6:00 position approx 2 cm diameter, nipple discharge absent and mild tenderness right breast  CV: regular rate and rhythm, normal S1 S2, no S3 or S4, no murmur, click or rub, no peripheral edema and peripheral pulses strong  ABDOMEN: soft, nontender, no hepatosplenomegaly, no masses and bowel sounds normal  MS: no gross musculoskeletal defects noted, no edema        Assessment & Plan     Lump or mass in breast  Possible recurrence of previous cyst. Will obtain a mammogram and US to evaluate.  - MA Diagnostic Digital Bilateral; Future  - US Breast Right Limited 1-3 " Quadrants; Future    Iron deficiency anemia, unspecified iron deficiency anemia type  Reports taking iron. Had been diagnosed with iron deficiency while living in [place]. Will recheck these levels and discuss at her annual physical. Future labs.  - CBC with platelets  - Ferritin    Need for tetanus booster  Vaccine received today.  - TD (ADULT, 7+) PRESERVE FREE  - VACCINE ADMINISTRATION, INITIAL    Fatigue, unspecified type  Recheck labs for annual physical - future labs.  - CBC with platelets  - Ferritin  - Basic metabolic panel  (Ca, Cl, CO2, Creat, Gluc, K, Na, BUN)    Screening for HIV (human immunodeficiency virus)  Per patient request - future lab.  - HIV Antigen Antibody Combo    Colon cancer screening  Would like a colonoscopy for colon cancer screening.   - GASTROENTEROLOGY ADULT REF PROCEDURE ONLY; Future    Need for shingles vaccine  Shingles vaccine given today.  - ZOSTER VACCINE RECOMBINANT ADJUVANTED IM NJX     Screening cholesterol level  For annual physical - future lab.  - Lipid panel reflex to direct LDL Fasting       Return in about 1 week (around 9/8/2020) for Annual physical, PAP.    Anastasia Holguin PA-C  [facility]

## 2020-09-01 NOTE — NURSING NOTE
Prior to immunization administration, verified patients identity using patient s name and date of birth. Please see Immunization Activity for additional information.     Screening Questionnaire for Adult Immunization    Are you sick today?   No   Do you have allergies to medications, food, a vaccine component or latex?   No   Have you ever had a serious reaction after receiving a vaccination?   No   Do you have a long-term health problem with heart, lung, kidney, or metabolic disease (e.g., diabetes), asthma, a blood disorder, no spleen, complement component deficiency, a cochlear implant, or a spinal fluid leak?  Are you on long-term aspirin therapy?   No   Do you have cancer, leukemia, HIV/AIDS, or any other immune system problem?   No   Do you have a parent, brother, or sister with an immune system problem?   No   In the past 3 months, have you taken medications that affect  your immune system, such as prednisone, other steroids, or anticancer drugs; drugs for the treatment of rheumatoid arthritis, Crohn s disease, or psoriasis; or have you had radiation treatments?   No   Have you had a seizure, or a brain or other nervous system problem?   No   During the past year, have you received a transfusion of blood or blood    products, or been given immune (gamma) globulin or antiviral drug?   No   For women: Are you pregnant or is there a chance you could become       pregnant during the next month?   No   Have you received any vaccinations in the past 4 weeks?   No     Immunization questionnaire answers were all negative.      Patient instructed to remain in clinic for 15 minutes afterwards, and to report any adverse reaction to me immediately.  Vaccine information supplied for td and shingrix.  Verified patient's name and date of birth prior to injection.     Screening performed by Padmini Au MA on 9/1/2020 at 11:22 AM.

## 2020-09-11 ENCOUNTER — ANCILLARY PROCEDURE (OUTPATIENT)
Dept: MAMMOGRAPHY | Facility: CLINIC | Age: 50
End: 2020-09-11
Attending: PHYSICIAN ASSISTANT
Payer: COMMERCIAL

## 2020-09-11 ENCOUNTER — ANCILLARY PROCEDURE (OUTPATIENT)
Dept: ULTRASOUND IMAGING | Facility: CLINIC | Age: 50
End: 2020-09-11
Attending: PHYSICIAN ASSISTANT
Payer: COMMERCIAL

## 2020-09-11 ENCOUNTER — OFFICE VISIT (OUTPATIENT)
Dept: FAMILY MEDICINE | Facility: CLINIC | Age: 50
End: 2020-09-11
Payer: COMMERCIAL

## 2020-09-11 VITALS
TEMPERATURE: 99 F | OXYGEN SATURATION: 99 % | DIASTOLIC BLOOD PRESSURE: 68 MMHG | WEIGHT: 145 LBS | SYSTOLIC BLOOD PRESSURE: 113 MMHG | HEART RATE: 71 BPM | BODY MASS INDEX: 24.53 KG/M2

## 2020-09-11 DIAGNOSIS — Z00.00 ROUTINE GENERAL MEDICAL EXAMINATION AT A HEALTH CARE FACILITY: Primary | ICD-10-CM

## 2020-09-11 DIAGNOSIS — N63.0 LUMP OR MASS IN BREAST: ICD-10-CM

## 2020-09-11 DIAGNOSIS — M20.21 HALLUX RIGIDUS, RIGHT FOOT: ICD-10-CM

## 2020-09-11 PROCEDURE — 87624 HPV HI-RISK TYP POOLED RSLT: CPT | Performed by: PHYSICIAN ASSISTANT

## 2020-09-11 PROCEDURE — G0145 SCR C/V CYTO,THINLAYER,RESCR: HCPCS | Performed by: PHYSICIAN ASSISTANT

## 2020-09-11 PROCEDURE — 76642 ULTRASOUND BREAST LIMITED: CPT | Mod: RT | Performed by: RADIOLOGY

## 2020-09-11 PROCEDURE — 99396 PREV VISIT EST AGE 40-64: CPT | Performed by: PHYSICIAN ASSISTANT

## 2020-09-11 PROCEDURE — G0279 TOMOSYNTHESIS, MAMMO: HCPCS | Performed by: RADIOLOGY

## 2020-09-11 PROCEDURE — 99213 OFFICE O/P EST LOW 20 MIN: CPT | Mod: 25 | Performed by: PHYSICIAN ASSISTANT

## 2020-09-11 PROCEDURE — 77066 DX MAMMO INCL CAD BI: CPT | Performed by: RADIOLOGY

## 2020-09-11 ASSESSMENT — PAIN SCALES - GENERAL: PAINLEVEL: MODERATE PAIN (5)

## 2020-09-11 NOTE — LETTER
September 22, 2020      Maris Saenz  [address] [facility] [address]        Dear MsIsabelDany,    We are happy to inform you that your recent Pap smear and Human Papillomavirus (HPV) test results are normal and negative.    It is recommended that you have your next Pap smear and Human Papillomavirus (HPV) test in 3 years. You will also need to return to the clinic every year for an annual wellness visit.    If you have additional questions regarding this result, please contact our office and we will be happy to assist you.      Sincerely,    Your [facility] Care Team

## 2020-09-11 NOTE — PROGRESS NOTES
SUBJECTIVE:   CC: Maris Saenz is an 50 year old woman who presents for preventive health visit.     Healthy Habits:    Do you get at least three servings of calcium containing foods daily (dairy, green leafy vegetables, etc.)? yes    Amount of exercise or daily activities, outside of work: 0 day(s) per week    Problems taking medications regularly No    Medication side effects: No    Have you had an eye exam in the past two years? yes    Do you see a dentist twice per year? no    Do you have sleep apnea, excessive snoring or daytime drowsiness?no      Joint or Musculoskeletal Pain  Duration of complaint: Right greater toe pain,ongoing pain for a long time. Cannot recall an injury. No swelling. No bruising. Hurts when she is on her feet a lot.     Today's PHQ-2 Score:   PHQ-2 ( 1999 Pfizer) 9/11/2020 9/1/2020   Q1: Little interest or pleasure in doing things 0 0   Q2: Feeling down, depressed or hopeless 0 0   PHQ-2 Score 0 0       Abuse: Current or Past(Physical, Sexual or Emotional)- No  Do you feel safe in your environment? Yes        Social History     Tobacco Use     Smoking status: Former Smoker     Types: Other     Smokeless tobacco: Never Used   Substance Use Topics     Alcohol use: No     If you drink alcohol do you typically have >3 drinks per day or >7 drinks per week? No                       Alissa Shah MA    Reviewed orders with patient.  Reviewed health maintenance and updated orders accordingly - Yes  BP Readings from Last 3 Encounters:   09/11/20 113/68   09/01/20 106/72   04/29/17 106/70    Wt Readings from Last 3 Encounters:   09/11/20 65.8 kg (145 lb)   09/01/20 66.7 kg (147 lb)   04/29/17 69.9 kg (154 lb)                    Mammogram Screening: Patient over age 50, mutual decision to screen reflected in health maintenance.    Pertinent mammograms are reviewed under the imaging tab.  History of abnormal Pap smear: NO - age 30-65 PAP every 5 years with negative HPV co-testing  recommended  PAP / HPV Latest Ref Rng & Units 4/24/2017 8/28/2015   PAP - NIL NIL   HPV 16 DNA NEG Negative Negative   HPV 18 DNA NEG Negative Negative   OTHER HR HPV NEG Negative Negative     Reviewed and updated as needed this visit by clinical staff  Tobacco  Allergies  Meds  Problems  Med Hx  Surg Hx  Fam Hx  Soc Hx          Reviewed and updated as needed this visit by Provider  Tobacco  Allergies  Meds  Problems  Med Hx  Surg Hx  Fam Hx            ROS:  CONSTITUTIONAL: NEGATIVE for fever, chills, change in weight  INTEGUMENTARY/SKIN: NEGATIVE for worrisome rashes, moles or lesions  EYES: NEGATIVE for vision changes or irritation  ENT: NEGATIVE for ear, mouth and throat problems  RESP: NEGATIVE for significant cough or SOB  BREAST: NEGATIVE for masses, tenderness or discharge  CV: NEGATIVE for chest pain, palpitations or peripheral edema  GI: NEGATIVE for nausea, abdominal pain, heartburn, or change in bowel habits  : NEGATIVE for unusual urinary or vaginal symptoms. No vaginal bleeding.  MUSCULOSKELETAL: NEGATIVE for significant arthralgias or myalgia  NEURO: NEGATIVE for weakness, dizziness or paresthesias  PSYCHIATRIC: NEGATIVE for changes in mood or affect     OBJECTIVE:   /68 (BP Location: Left arm, Patient Position: Chair, Cuff Size: Adult Regular)   Pulse 71   Temp 99  F (37.2  C) (Oral)   Wt 65.8 kg (145 lb)   LMP 04/15/2017   SpO2 99%   BMI 24.53 kg/m    EXAM:  GENERAL: healthy, alert and no distress  EYES: Eyes grossly normal to inspection, PERRL and conjunctivae and sclerae normal  HENT: ear canals and TM's normal, nose and mouth without ulcers or lesions  NECK: no adenopathy, no asymmetry, masses, or scars and thyroid normal to palpation  RESP: lungs clear to auscultation - no rales, rhonchi or wheezes  BREAST: normal without masses, tenderness or nipple discharge and no palpable axillary masses or adenopathy  CV: regular rate and rhythm, normal S1 S2, no S3 or S4, no  "murmur, click or rub, no peripheral edema and peripheral pulses strong  ABDOMEN: soft, nontender, no hepatosplenomegaly, no masses and bowel sounds normal   (female): normal female external genitalia, normal urethral meatus, vaginal mucosa pink, moist, well rugated  MS: no gross musculoskeletal defects noted, no edema. Bilateral feet evaluated today. Right great toe ROM limited when compared to the left side. No effusion present. No bunion. No callous formation.   SKIN: no suspicious lesions or rashes  NEURO: Normal strength and tone, mentation intact and speech normal  PSYCH: mentation appears normal, affect normal/bright    Diagnostic Test Results:  Labs reviewed in Epic    ASSESSMENT/PLAN:   1. Routine general medical examination at a health care facility  Well person. Reports history of cervical cancer. No documentation found regarding this. States she cannot remember where she received care. Reports hysterectomy - but thinks she just had an ovary removed. Patient returning to clinic for fasting labs next week.  - HPV High Risk Types DNA Cervical  - Pap imaged thin layer screen with HPV - recommended age 30 - 65 years (select HPV order below)    2. Hallux rigidus, right foot  Advised wearing rigid footwear to see if pain improves. Discussed other treatments including OTC NSAIDS, shoe inserts, corticosteroid injections, and if severe, 1st MTP fusion. Patient will let me know if pain persists.     COUNSELING:   Special attention given to:        Regular exercise       Healthy diet/nutrition       Immunizations    Declined: Influenza         Estimated body mass index is 24.53 kg/m  as calculated from the following:    Height as of 9/1/20: 1.638 m (5' 4.47\").    Weight as of this encounter: 65.8 kg (145 lb).        She reports that she has quit smoking. Her smoking use included other. She has never used smokeless tobacco.      Counseling Resources:  ATP IV Guidelines  Pooled Cohorts Equation Calculator  Breast " Cancer Risk Calculator  BRCA-Related Cancer Risk Assessment: FHS-7 Tool  FRAX Risk Assessment  ICSI Preventive Guidelines  Dietary Guidelines for Americans, 2010  USDA's MyPlate  ASA Prophylaxis  Lung CA Screening    Anastasia Holguin PA-C  [facility]

## 2020-09-16 DIAGNOSIS — D50.9 IRON DEFICIENCY ANEMIA, UNSPECIFIED IRON DEFICIENCY ANEMIA TYPE: ICD-10-CM

## 2020-09-16 DIAGNOSIS — R53.83 FATIGUE, UNSPECIFIED TYPE: ICD-10-CM

## 2020-09-16 DIAGNOSIS — Z13.220 SCREENING CHOLESTEROL LEVEL: ICD-10-CM

## 2020-09-16 DIAGNOSIS — Z11.4 SCREENING FOR HIV (HUMAN IMMUNODEFICIENCY VIRUS): ICD-10-CM

## 2020-09-16 LAB
ANION GAP SERPL CALCULATED.3IONS-SCNC: 5 MMOL/L (ref 3–14)
BUN SERPL-MCNC: 7 MG/DL (ref 7–30)
CALCIUM SERPL-MCNC: 9.6 MG/DL (ref 8.5–10.1)
CHLORIDE SERPL-SCNC: 106 MMOL/L (ref 94–109)
CHOLEST SERPL-MCNC: 195 MG/DL
CO2 SERPL-SCNC: 29 MMOL/L (ref 20–32)
COPATH REPORT: NORMAL
CREAT SERPL-MCNC: 0.91 MG/DL (ref 0.52–1.04)
ERYTHROCYTE [DISTWIDTH] IN BLOOD BY AUTOMATED COUNT: 11.9 % (ref 10–15)
GFR SERPL CREATININE-BSD FRML MDRD: 73 ML/MIN/{1.73_M2}
GLUCOSE SERPL-MCNC: 92 MG/DL (ref 70–99)
HBA1C MFR BLD: 5.3 % (ref 0–5.6)
HCT VFR BLD AUTO: 38.1 % (ref 35–47)
HDLC SERPL-MCNC: 58 MG/DL
HGB BLD-MCNC: 12.4 G/DL (ref 11.7–15.7)
LDLC SERPL CALC-MCNC: 117 MG/DL
MCH RBC QN AUTO: 31.1 PG (ref 26.5–33)
MCHC RBC AUTO-ENTMCNC: 32.5 G/DL (ref 31.5–36.5)
MCV RBC AUTO: 96 FL (ref 78–100)
NONHDLC SERPL-MCNC: 137 MG/DL
PAP: NORMAL
PLATELET # BLD AUTO: 288 10E9/L (ref 150–450)
POTASSIUM SERPL-SCNC: 3.8 MMOL/L (ref 3.4–5.3)
RBC # BLD AUTO: 3.99 10E12/L (ref 3.8–5.2)
SODIUM SERPL-SCNC: 140 MMOL/L (ref 133–144)
TRIGL SERPL-MCNC: 99 MG/DL
WBC # BLD AUTO: 6.6 10E9/L (ref 4–11)

## 2020-09-16 PROCEDURE — 87389 HIV-1 AG W/HIV-1&-2 AB AG IA: CPT | Performed by: PHYSICIAN ASSISTANT

## 2020-09-16 PROCEDURE — 85027 COMPLETE CBC AUTOMATED: CPT | Performed by: PHYSICIAN ASSISTANT

## 2020-09-16 PROCEDURE — 80048 BASIC METABOLIC PNL TOTAL CA: CPT | Performed by: PHYSICIAN ASSISTANT

## 2020-09-16 PROCEDURE — 80061 LIPID PANEL: CPT | Performed by: PHYSICIAN ASSISTANT

## 2020-09-16 PROCEDURE — 83036 HEMOGLOBIN GLYCOSYLATED A1C: CPT | Performed by: PHYSICIAN ASSISTANT

## 2020-09-16 PROCEDURE — 36415 COLL VENOUS BLD VENIPUNCTURE: CPT | Performed by: PHYSICIAN ASSISTANT

## 2020-09-16 PROCEDURE — 82728 ASSAY OF FERRITIN: CPT | Performed by: PHYSICIAN ASSISTANT

## 2020-09-17 LAB
FERRITIN SERPL-MCNC: 70 NG/ML (ref 8–252)
HIV 1+2 AB+HIV1 P24 AG SERPL QL IA: NONREACTIVE

## 2020-09-18 LAB
FINAL DIAGNOSIS: NORMAL
HPV HR 12 DNA CVX QL NAA+PROBE: NEGATIVE
HPV16 DNA SPEC QL NAA+PROBE: NEGATIVE
HPV18 DNA SPEC QL NAA+PROBE: NEGATIVE
SPECIMEN DESCRIPTION: NORMAL
SPECIMEN SOURCE CVX/VAG CYTO: NORMAL

## 2020-12-13 ENCOUNTER — HEALTH MAINTENANCE LETTER (OUTPATIENT)
Age: 50
End: 2020-12-13

## 2021-09-26 ENCOUNTER — HEALTH MAINTENANCE LETTER (OUTPATIENT)
Age: 51
End: 2021-09-26

## 2021-11-21 ENCOUNTER — HEALTH MAINTENANCE LETTER (OUTPATIENT)
Age: 51
End: 2021-11-21

## 2023-04-23 ENCOUNTER — HEALTH MAINTENANCE LETTER (OUTPATIENT)
Age: 53
End: 2023-04-23